# Patient Record
Sex: MALE | Race: BLACK OR AFRICAN AMERICAN | NOT HISPANIC OR LATINO | Employment: UNEMPLOYED | URBAN - METROPOLITAN AREA
[De-identification: names, ages, dates, MRNs, and addresses within clinical notes are randomized per-mention and may not be internally consistent; named-entity substitution may affect disease eponyms.]

---

## 2023-04-14 PROBLEM — F12.90 CANNABIS USE DISORDER: Status: ACTIVE | Noted: 2023-04-14

## 2023-04-14 PROBLEM — F15.90 STIMULANT USE DISORDER: Status: ACTIVE | Noted: 2023-04-14

## 2023-04-14 PROBLEM — F29 SCHIZOPHRENIA SPECTRUM DISORDER WITH PSYCHOTIC DISORDER TYPE NOT YET DETERMINED: Status: ACTIVE | Noted: 2023-04-14

## 2023-04-21 PROBLEM — F06.1 CATATONIA: Status: ACTIVE | Noted: 2023-04-21

## 2023-08-09 ENCOUNTER — HOSPITAL ENCOUNTER (EMERGENCY)
Facility: HOSPITAL | Age: 33
Discharge: PSYCHIATRIC OR SUBSTANCE USE FACILITY | End: 2023-08-09
Attending: EMERGENCY MEDICINE
Payer: MEDICAID

## 2023-08-09 VITALS
HEART RATE: 102 BPM | HEIGHT: 66 IN | BODY MASS INDEX: 22.5 KG/M2 | WEIGHT: 140 LBS | TEMPERATURE: 96 F | RESPIRATION RATE: 18 BRPM | DIASTOLIC BLOOD PRESSURE: 67 MMHG | SYSTOLIC BLOOD PRESSURE: 103 MMHG | OXYGEN SATURATION: 98 %

## 2023-08-09 DIAGNOSIS — F32.A DEPRESSION, UNSPECIFIED DEPRESSION TYPE: Primary | ICD-10-CM

## 2023-08-09 DIAGNOSIS — F60.3 BORDERLINE PERSONALITY DISORDER: ICD-10-CM

## 2023-08-09 DIAGNOSIS — E16.2 HYPOGLYCEMIA: ICD-10-CM

## 2023-08-09 LAB
ALBUMIN SERPL-MCNC: 4.2 G/DL (ref 3.5–5)
ALBUMIN/GLOB SERPL: 1.3 RATIO (ref 1.1–2)
ALP SERPL-CCNC: 81 UNIT/L (ref 40–150)
ALT SERPL-CCNC: 13 UNIT/L (ref 0–55)
AST SERPL-CCNC: 31 UNIT/L (ref 5–34)
BASOPHILS # BLD AUTO: 0.02 X10(3)/MCL
BASOPHILS NFR BLD AUTO: 0.3 %
BILIRUB SERPL-MCNC: 1.1 MG/DL
BUN SERPL-MCNC: 21 MG/DL (ref 8.9–20.6)
CALCIUM SERPL-MCNC: 10.1 MG/DL (ref 8.4–10.2)
CHLORIDE SERPL-SCNC: 105 MMOL/L (ref 98–107)
CO2 SERPL-SCNC: 18 MMOL/L (ref 22–29)
CREAT SERPL-MCNC: 1.7 MG/DL (ref 0.73–1.18)
EOSINOPHIL # BLD AUTO: 0.1 X10(3)/MCL (ref 0–0.9)
EOSINOPHIL NFR BLD AUTO: 1.3 %
ERYTHROCYTE [DISTWIDTH] IN BLOOD BY AUTOMATED COUNT: 13.2 % (ref 11.5–17)
GFR SERPLBLD CREATININE-BSD FMLA CKD-EPI: 54 MLS/MIN/1.73/M2
GLOBULIN SER-MCNC: 3.3 GM/DL (ref 2.4–3.5)
GLUCOSE SERPL-MCNC: 64 MG/DL (ref 74–100)
HCT VFR BLD AUTO: 52.7 % (ref 42–52)
HGB BLD-MCNC: 18.4 G/DL (ref 14–18)
IMM GRANULOCYTES # BLD AUTO: 0.02 X10(3)/MCL (ref 0–0.04)
IMM GRANULOCYTES NFR BLD AUTO: 0.3 %
LYMPHOCYTES # BLD AUTO: 2.68 X10(3)/MCL (ref 0.6–4.6)
LYMPHOCYTES NFR BLD AUTO: 35 %
MAGNESIUM SERPL-MCNC: 2 MG/DL (ref 1.6–2.6)
MCH RBC QN AUTO: 29.7 PG (ref 27–31)
MCHC RBC AUTO-ENTMCNC: 34.9 G/DL (ref 33–36)
MCV RBC AUTO: 85.1 FL (ref 80–94)
MONOCYTES # BLD AUTO: 0.61 X10(3)/MCL (ref 0.1–1.3)
MONOCYTES NFR BLD AUTO: 8 %
NEUTROPHILS # BLD AUTO: 4.23 X10(3)/MCL (ref 2.1–9.2)
NEUTROPHILS NFR BLD AUTO: 55.1 %
NRBC BLD AUTO-RTO: 0 %
PLATELET # BLD AUTO: 297 X10(3)/MCL (ref 130–400)
PMV BLD AUTO: 9.7 FL (ref 7.4–10.4)
POTASSIUM SERPL-SCNC: 4.6 MMOL/L (ref 3.5–5.1)
PROT SERPL-MCNC: 7.5 GM/DL (ref 6.4–8.3)
RBC # BLD AUTO: 6.19 X10(6)/MCL (ref 4.7–6.1)
SODIUM SERPL-SCNC: 141 MMOL/L (ref 136–145)
WBC # SPEC AUTO: 7.66 X10(3)/MCL (ref 4.5–11.5)

## 2023-08-09 PROCEDURE — 25000003 PHARM REV CODE 250: Performed by: EMERGENCY MEDICINE

## 2023-08-09 PROCEDURE — 85025 COMPLETE CBC W/AUTO DIFF WBC: CPT | Performed by: NURSE PRACTITIONER

## 2023-08-09 PROCEDURE — 63600175 PHARM REV CODE 636 W HCPCS: Performed by: EMERGENCY MEDICINE

## 2023-08-09 PROCEDURE — 96360 HYDRATION IV INFUSION INIT: CPT

## 2023-08-09 PROCEDURE — 99284 EMERGENCY DEPT VISIT MOD MDM: CPT

## 2023-08-09 PROCEDURE — 80053 COMPREHEN METABOLIC PANEL: CPT | Performed by: NURSE PRACTITIONER

## 2023-08-09 PROCEDURE — 96361 HYDRATE IV INFUSION ADD-ON: CPT

## 2023-08-09 PROCEDURE — 83735 ASSAY OF MAGNESIUM: CPT | Performed by: NURSE PRACTITIONER

## 2023-08-09 RX ADMIN — DEXTROSE MONOHYDRATE 250 ML: 100 INJECTION, SOLUTION INTRAVENOUS at 02:08

## 2023-08-09 RX ADMIN — SODIUM CHLORIDE, POTASSIUM CHLORIDE, SODIUM LACTATE AND CALCIUM CHLORIDE 1000 ML: 600; 310; 30; 20 INJECTION, SOLUTION INTRAVENOUS at 03:08

## 2023-08-09 NOTE — ED NOTES
[facility]

## 2023-08-09 NOTE — ED PROVIDER NOTES
"Encounter Date: 8/9/2023       History     Chief Complaint   Patient presents with    Mental Health Problem     Pt from [facility] for patient "not eating or drinking" and "refusing meds and not talking." [facility] sitter report pt refusing to comply with lab draw. Pt is awake, alert, nonverbal to questioning, breathing even and unlabored, skin warm and pink.      History provided by: staff from [facility].   Mental Health Problem  The primary symptoms include negative symptoms. The current episode started two days ago.   The degree of incapacity that he is experiencing as a consequence of his illness is severe. Sequelae of the illness include an inability to care for self. Additional symptoms of the illness include hypersomnia, appetite change, psychomotor retardation and poor judgment. Risk factors that are present for mental illness include a history of mental illness.   Pt admitted to [facility] on a PEC from [facility].  Since admission, he has been minimally communicative and refusing meds and food/drink.  Told nurse this morning that he would fight them if they tried to give him IM medications.  Refusing to speak to me, but shakes his head "no" when I tell him that we need to draw blood.    Review of patient's allergies indicates:  Not on File  No past medical history on file. Psychiatric illness  No past surgical history on file.  No family history on file.     Review of Systems   Constitutional:  Positive for appetite change. Negative for fever.   HENT:  Negative for sore throat.    Respiratory:  Negative for shortness of breath.    Cardiovascular:  Negative for chest pain.   Gastrointestinal:  Negative for nausea.   Genitourinary:  Negative for dysuria.   Musculoskeletal:  Negative for back pain.   Skin:  Negative for rash.   Neurological:  Negative for weakness.   Hematological:  Does not bruise/bleed easily.       Physical Exam     Initial Vitals [08/09/23 1042]   BP Pulse " Resp Temp SpO2   112/68 76 16 98.2 °F (36.8 °C) 99 %      MAP       --         Physical Exam    Nursing note and vitals reviewed.  Constitutional: He appears well-developed and well-nourished.   HENT:   Head: Normocephalic and atraumatic.   Right Ear: External ear normal.   Left Ear: External ear normal.   Nose: Nose normal.   Eyes: Conjunctivae and EOM are normal. Pupils are equal, round, and reactive to light.   Neck: Neck supple.   Normal range of motion.  Cardiovascular:  Normal rate, regular rhythm, normal heart sounds and intact distal pulses.           Pulmonary/Chest: Breath sounds normal.   Abdominal: Abdomen is soft. Bowel sounds are normal.   Musculoskeletal:         General: Normal range of motion.      Cervical back: Normal range of motion and neck supple.     Neurological: He is alert and oriented to person, place, and time. He has normal strength. GCS score is 15. GCS eye subscore is 4. GCS verbal subscore is 5. GCS motor subscore is 6.   Skin: Skin is warm and dry. Capillary refill takes less than 2 seconds.   Psychiatric: He has a normal mood and affect. His behavior is normal. Judgment and thought content normal.         ED Course   Procedures  Labs Reviewed   CBC WITH DIFFERENTIAL - Abnormal; Notable for the following components:       Result Value    RBC 6.19 (*)     Hgb 18.4 (*)     Hct 52.7 (*)     All other components within normal limits   COMPREHENSIVE METABOLIC PANEL - Abnormal; Notable for the following components:    Carbon Dioxide 18 (*)     Glucose Level 64 (*)     Blood Urea Nitrogen 21.0 (*)     Creatinine 1.70 (*)     All other components within normal limits   MAGNESIUM - Normal   URINALYSIS, REFLEX TO URINE CULTURE          Imaging Results    None          Medications   lactated ringers bolus 1,000 mL (0 mLs Intravenous Stopped 8/9/23 1648)   dextrose 10% bolus 250 mL 250 mL (0 mLs Intravenous Stopped 8/9/23 1524)         Will check CBC and chemistry to see if there is clinical  evidence of dehydration.  Symptoms appear to stem from his psychiatric illness.        ED Course as of 08/09/23 1657   Wed Aug 09, 2023   1625 Pt now speaking and arguing with staff.  Not sure if it was by his own choice or if giving dextrose helped perk him up, but he is now stable for D/C back to [facility] for continued psychiatric care. [CL]      ED Course User Index  [CL] Antony Holliday MD                 Clinical Impression:   Final diagnoses:  [F32.A] Depression, unspecified depression type (Primary)  [F60.3] Borderline personality disorder  [E16.2] Hypoglycemia        ED Disposition Condition    Discharge Stable          ED Prescriptions    None       Follow-up Information       Follow up With Specialties Details Why Contact Info    Follow up with your primary care provider in 2 weeks if not improved                 Antony Holliday MD  08/09/23 1656

## 2023-08-09 NOTE — ED NOTES
"Bed: 24  Expected date:   Expected time:   Means of arrival:   Comments:  EMS from [facility], "catatonic"  "

## 2024-01-28 PROBLEM — F20.9 SCHIZOPHRENIA: Status: ACTIVE | Noted: 2023-04-14

## 2024-01-28 PROBLEM — F20.2 CATATONIC SCHIZOPHRENIA: Status: ACTIVE | Noted: 2024-01-28

## 2024-01-29 PROBLEM — I96 TOE NECROSIS: Status: ACTIVE | Noted: 2024-01-29

## 2024-01-29 PROBLEM — R60.0 LOWER EXTREMITY EDEMA: Status: ACTIVE | Noted: 2024-01-29

## 2024-02-04 PROBLEM — L03.116 CELLULITIS OF LEFT LOWER EXTREMITY: Status: ACTIVE | Noted: 2024-02-04

## 2024-02-05 PROBLEM — F17.200 TOBACCO USE DISORDER: Status: ACTIVE | Noted: 2018-06-19

## 2024-02-05 PROBLEM — S01.511A LIP LACERATION: Status: ACTIVE | Noted: 2018-06-30

## 2024-02-05 PROBLEM — S09.90XA HEAD TRAUMA: Status: ACTIVE | Noted: 2018-06-30

## 2024-02-05 PROBLEM — F20.3 UNDIFFERENTIATED SCHIZOPHRENIA: Status: ACTIVE | Noted: 2024-02-05

## 2024-02-05 PROBLEM — F63.81 INTERMITTENT EXPLOSIVE DISORDER: Status: ACTIVE | Noted: 2018-07-18

## 2024-02-05 PROBLEM — F20.0 PARANOID SCHIZOPHRENIA: Status: ACTIVE | Noted: 2024-02-05

## 2024-02-05 PROBLEM — F29 PSYCHOSIS: Status: ACTIVE | Noted: 2018-06-18

## 2024-02-05 PROBLEM — F12.20 CANNABIS USE DISORDER, MODERATE, DEPENDENCE: Status: ACTIVE | Noted: 2018-06-19

## 2024-02-05 PROBLEM — F94.0 SELECTIVE MUTISM: Status: ACTIVE | Noted: 2018-06-19

## 2024-02-11 PROBLEM — L03.119 CELLULITIS OF FOOT: Status: ACTIVE | Noted: 2024-02-11

## 2024-02-21 PROBLEM — L02.91 ABSCESS: Status: ACTIVE | Noted: 2024-02-21

## 2024-03-17 PROBLEM — D64.9 ANEMIA: Status: ACTIVE | Noted: 2024-03-17

## 2024-03-29 PROBLEM — R94.31 PROLONGED QT INTERVAL: Status: ACTIVE | Noted: 2024-03-29

## 2024-03-29 PROBLEM — R94.31 QT PROLONGATION: Status: ACTIVE | Noted: 2024-03-29
